# Patient Record
Sex: FEMALE | Race: WHITE | NOT HISPANIC OR LATINO | URBAN - NONMETROPOLITAN AREA
[De-identification: names, ages, dates, MRNs, and addresses within clinical notes are randomized per-mention and may not be internally consistent; named-entity substitution may affect disease eponyms.]

---

## 2017-03-24 ENCOUNTER — PROCEDURE VISIT (OUTPATIENT)
Dept: OBSTETRICS AND GYNECOLOGY | Facility: CLINIC | Age: 82
End: 2017-03-24

## 2017-03-24 VITALS
WEIGHT: 145 LBS | SYSTOLIC BLOOD PRESSURE: 132 MMHG | BODY MASS INDEX: 24.16 KG/M2 | HEIGHT: 65 IN | DIASTOLIC BLOOD PRESSURE: 80 MMHG

## 2017-03-24 DIAGNOSIS — Z01.419 ENCOUNTER FOR WELL WOMAN EXAM WITH ROUTINE GYNECOLOGICAL EXAM: Primary | ICD-10-CM

## 2017-03-24 PROCEDURE — G0101 CA SCREEN;PELVIC/BREAST EXAM: HCPCS | Performed by: OBSTETRICS & GYNECOLOGY

## 2017-03-24 RX ORDER — CHLORAL HYDRATE 500 MG
CAPSULE ORAL
COMMUNITY

## 2017-03-24 RX ORDER — ESCITALOPRAM OXALATE 10 MG/1
10 TABLET ORAL DAILY
COMMUNITY
Start: 2017-02-19

## 2017-03-24 RX ORDER — ROSUVASTATIN CALCIUM 10 MG/1
10 TABLET, COATED ORAL DAILY
COMMUNITY

## 2017-03-24 RX ORDER — AMLODIPINE BESYLATE 10 MG/1
10 TABLET ORAL DAILY
COMMUNITY
Start: 2017-01-09

## 2017-03-25 NOTE — PROGRESS NOTES
"Subjective     Chief Complaint   Patient presents with   • Gynecologic Exam       Prachi Marsh is a 81 y.o.  presents today for an annual exam.  Had a hysterectomy with BSO many years ago.  No issues since.  No bleeding, discharge, or pelvic pain.  No issues with incontinence or urination.      Last mammogram was last summer, has them preformed in [place].  Colonoscopy up to date per patient.  And patient states she had a DEXA scan about 1-2 years ago and no signs of osteoporosis.  No history of abnl paps or STDs.      Past Medical History:   Diagnosis Date   • Breast cancer    • Hyperlipidemia    • Hypertension      Past Surgical History:   Procedure Laterality Date   • BACK SURGERY     • BREAST LUMPECTOMY     • HEMORRHOIDECTOMY     • HYSTERECTOMY     • TUBAL ABDOMINAL LIGATION     • VAGINA SURGERY       Social History     Social History   • Marital status: Single     Spouse name: N/A   • Number of children: N/A   • Years of education: N/A     Occupational History   • Not on file.     Social History Main Topics   • Smoking status: Never Smoker   • Smokeless tobacco: Not on file   • Alcohol use No   • Drug use: No   • Sexual activity: Not on file     Other Topics Concern   • Not on file     Social History Narrative   • No narrative on file     Family History   Problem Relation Age of Onset   • Emphysema Father    • Alzheimer's disease Mother      Review of Systems - comprehensive ROS preformed and all negative.     Objective      /80 (BP Location: Left arm, Patient Position: Sitting, Cuff Size: Adult)  Ht 65\" (165.1 cm)  Wt 145 lb (65.8 kg)  LMP Comment: hysterectomy  BMI 24.13 kg/m2    Physical Exam  General:   Appears stated age, AAOx3, NAD   Heart: RRR no m/r/g   Lungs: CTAB   Breast: Symmetrical, no masses or lumps noted exam bilaterally, no skin retraction or dimpling noted. No nipple discharge.  Well healed surgical scar on left breast    Neck: No neck fullness, thyroid normal with no nodules " noted   Abdomen: Soft, nttp, no masses palpated; multiple well healed surgical scars   Pelvis: External genitalia - NEFG  Urethra - normal appearing, no urethra caruncle or prolapse  Vagina - moderate amount of vaginal atrophy, no discharge or blood noted on glove at end of bimanual exam   Cervix - Surgically absent, vaginal cuff intact   Uterus - Surgically absent, no fullness at vaginal cuff  Adenxa - no adnexal fullness or masses noted  Anus - normal appearing, external hemorrhoids noted   Extremities: No CT or edema bilaterally    Neurologic: CN II - XII grossly intact         Assessment/Plan     ASSESSMENT  1. Encounter for well woman exam with routine gynecological exam        PLAN  1. Well woman exam  - No pap indicated given age and history of a LULA, BSO  - Reviewed self breast exams, Mammogram up to date  - Colonoscopy up to date per patient  - DEXA scan within last year per patient and was negative.   - RTC in 1 year or earlier if indicated.         New Medications Ordered This Visit   Medications   • amLODIPine (NORVASC) 10 MG tablet     Sig: Take 10 mg by mouth Daily.   • escitalopram (LEXAPRO) 10 MG tablet     Sig: Take 10 mg by mouth Daily.   • Omega-3 Fatty Acids (FISH OIL) 1000 MG capsule capsule     Sig: Take  by mouth Daily With Breakfast.   • rosuvastatin (CRESTOR) 10 MG tablet     Sig: Take 10 mg by mouth Daily.   • Ped Multivitamins-Fl-Iron (MULTIVITAMIN WITH FLUORIDE/IRON) 0.25-10 MG/ML solution solution     Sig: Take  by mouth Daily.       Sarah Mackay MD  3/25/2017

## 2017-07-17 ENCOUNTER — APPOINTMENT (OUTPATIENT)
Dept: WOMENS IMAGING | Facility: HOSPITAL | Age: 82
End: 2017-07-17

## 2017-07-17 PROCEDURE — G0202 SCR MAMMO BI INCL CAD: HCPCS | Performed by: RADIOLOGY

## 2017-07-17 PROCEDURE — 77063 BREAST TOMOSYNTHESIS BI: CPT | Performed by: RADIOLOGY

## 2017-07-17 PROCEDURE — MDREVIEWSP: Performed by: RADIOLOGY

## 2018-05-23 ENCOUNTER — OFFICE VISIT (OUTPATIENT)
Dept: OBSTETRICS AND GYNECOLOGY | Facility: CLINIC | Age: 83
End: 2018-05-23

## 2018-05-23 VITALS
DIASTOLIC BLOOD PRESSURE: 76 MMHG | WEIGHT: 137 LBS | HEIGHT: 65 IN | BODY MASS INDEX: 22.82 KG/M2 | SYSTOLIC BLOOD PRESSURE: 159 MMHG

## 2018-05-23 DIAGNOSIS — Z01.419 WELL WOMAN EXAM WITH ROUTINE GYNECOLOGICAL EXAM: Primary | ICD-10-CM

## 2018-05-23 PROCEDURE — G0101 CA SCREEN;PELVIC/BREAST EXAM: HCPCS | Performed by: OBSTETRICS & GYNECOLOGY

## 2018-05-23 RX ORDER — CLOBETASOL PROPIONATE 0.5 MG/G
CREAM TOPICAL
Qty: 45 G | Refills: 1 | Status: SHIPPED | OUTPATIENT
Start: 2018-05-23

## 2018-05-23 NOTE — PROGRESS NOTES
"Subjective     Chief Complaint   Patient presents with   • Gynecologic Exam       Prachi Marsh is a 82 y.o. presents today for annual exam.  Has noticed more itching down below at night time.  No discharge or bleeding.  No issues with urination or bowel movements.  Had hysterectomy, on no HRT.  Gets mammograms at  imaging place, had one this year with no issues or lesions found.    No history of abnl paps or STDs.      Past Medical History:   Diagnosis Date   • Breast cancer    • Hyperlipidemia    • Hypertension      Past Surgical History:   Procedure Laterality Date   • BACK SURGERY     • BREAST LUMPECTOMY     • HEMORRHOIDECTOMY     • HYSTERECTOMY     • TUBAL ABDOMINAL LIGATION     • VAGINA SURGERY       Social History     Social History   • Marital status: Single     Spouse name: N/A   • Number of children: N/A   • Years of education: N/A     Occupational History   • Not on file.     Social History Main Topics   • Smoking status: Never Smoker   • Smokeless tobacco: Not on file   • Alcohol use No   • Drug use: No   • Sexual activity: Not on file     Other Topics Concern   • Not on file     Social History Narrative   • No narrative on file     Family History   Problem Relation Age of Onset   • Emphysema Father    • Alzheimer's disease Mother      Review of Systems - comprehensive ROS preformed and all negative.      Objective      Ht 165.1 cm (65\")   Wt 62.1 kg (137 lb)   BMI 22.80 kg/m²     Physical Exam  General:   Appears stated age, AAOx3, NAD   Heart: RRR no m/r/g   Lungs: CTAB   Breast: Symmetrical, no masses or lumps noted exam bilaterally, no skin retraction or dimpling noted. Left nipple retracted, no nipple discharge bilaterally.  Well healed surgical scar on left breast   Neck: No neck fullness, thyroid normal with no nodules noted   Abdomen: Soft, nttp, no masses palpated; well healed surgical scars   Pelvis: External genitalia - NEFG, lichen sclerosis noted  Urethra - normal appearing, no urethra " caruncle or prolapse  Vagina - moderate amount of vaginal atrophy, no discharge or blood noted on glove at end of bimanual exam.   Cervix - Surgically absent  Uterus - no fullness at cuff  Adenxa - no adnexal fullness or masses noted  Anus - normal appearing, no hemorrhoids   Extremities: No CT or edema bilaterally    Neurologic: CN II - XII grossly intact     Assessment/Plan     ASSESSMENT  1. Well woman exam with routine gynecological exam        PLAN  1. Well woman exam   - No julius indicated today   - Reviewed self breast exam, mammogram up to date  - DEXA scan next year; encouraged calcium and vitamin D supplementation  - Colonoscopy up to date per patient  - Will to a trial of topical steroids for lichen  - RTC in 3 months for follow up    Sarah Mackay MD  5/23/2018

## 2018-07-18 ENCOUNTER — APPOINTMENT (OUTPATIENT)
Dept: WOMENS IMAGING | Facility: HOSPITAL | Age: 83
End: 2018-07-18

## 2018-07-18 PROCEDURE — 77067 SCR MAMMO BI INCL CAD: CPT | Performed by: RADIOLOGY

## 2018-07-18 PROCEDURE — 77063 BREAST TOMOSYNTHESIS BI: CPT | Performed by: RADIOLOGY

## 2018-07-18 PROCEDURE — MDREVIEWSP: Performed by: RADIOLOGY

## 2018-08-27 ENCOUNTER — OFFICE VISIT (OUTPATIENT)
Dept: OBSTETRICS AND GYNECOLOGY | Facility: CLINIC | Age: 83
End: 2018-08-27

## 2018-08-27 VITALS
DIASTOLIC BLOOD PRESSURE: 64 MMHG | WEIGHT: 136 LBS | BODY MASS INDEX: 22.66 KG/M2 | HEIGHT: 65 IN | SYSTOLIC BLOOD PRESSURE: 138 MMHG

## 2018-08-27 DIAGNOSIS — L90.0 LICHEN SCLEROSUS: Primary | ICD-10-CM

## 2018-08-27 PROCEDURE — 99212 OFFICE O/P EST SF 10 MIN: CPT | Performed by: OBSTETRICS & GYNECOLOGY

## 2018-08-27 NOTE — PROGRESS NOTES
"Subjective     Chief Complaint   Patient presents with   • Follow-up       Prachi Marsh is a 82 y.o. presents today for follow up.  Patient was seen on 5/23 for annual exam at which time she reported symptoms of lichen.  Lichen was confirmed on exam and was started on steroids.  She states that her symptoms significant improved upon starting the medication and now have resolved.  Continues to do them twice a week.  No new complaints or concerns.      No changes to PMH, PSH, family or social history since last visit.  No new medications or allergies.     Objective      /64   Ht 165.1 cm (65\")   Wt 61.7 kg (136 lb)   BMI 22.63 kg/m²     Physical Exam  General:   Appears stated age, AAOx3, NAD   Neurologic: CN II - XII grossly intact       Assessment/Plan     ASSESSMENT  1. Lichen sclerosus        PLAN  1. Lichen sclerosus  - Improvement with topical steroids  - Continue steroids, will send in refills  - RTC in 1 year for annual exam.     Sarah Mackay MD  8/27/2018           "

## 2018-09-21 RX ORDER — TRIAMCINOLONE ACETONIDE 1 MG/G
CREAM TOPICAL
Qty: 30 G | Refills: 1 | Status: SHIPPED | OUTPATIENT
Start: 2018-09-21